# Patient Record
Sex: MALE | Race: WHITE | Employment: FULL TIME | URBAN - METROPOLITAN AREA
[De-identification: names, ages, dates, MRNs, and addresses within clinical notes are randomized per-mention and may not be internally consistent; named-entity substitution may affect disease eponyms.]

---

## 2020-01-15 ENCOUNTER — HOSPITAL ENCOUNTER (EMERGENCY)
Age: 50
Discharge: HOME | End: 2020-01-15
Attending: EMERGENCY MEDICINE
Payer: COMMERCIAL

## 2020-01-15 VITALS
BODY MASS INDEX: 21.47 KG/M2 | SYSTOLIC BLOOD PRESSURE: 158 MMHG | WEIGHT: 150 LBS | RESPIRATION RATE: 20 BRPM | HEIGHT: 70 IN | DIASTOLIC BLOOD PRESSURE: 108 MMHG | HEART RATE: 77 BPM | OXYGEN SATURATION: 99 % | TEMPERATURE: 98.6 F

## 2020-01-15 PROCEDURE — 99282 EMERGENCY DEPT VISIT SF MDM: CPT

## 2020-01-15 PROCEDURE — 12001 RPR S/N/AX/GEN/TRNK 2.5CM/<: CPT

## 2020-01-15 ASSESSMENT — PAIN SCALES - GENERAL: PAINLEVEL_OUTOF10: 2

## 2020-01-15 NOTE — LETTER
[facility]  [address]  [address]  Phone: [phone]  Fax: [phone]             January 15, 2020    Patient: Dora Salmon   YOB: 1970   Date of Visit: 1/15/2020       To Whom It May Concern:    Dora Salmon was seen and treated in our emergency department on 1/15/2020. He may return to work on 1/16/2020.       Sincerely,             Signature:__________________________________

## 2020-01-15 NOTE — ED PROVIDER NOTES
injury. No foreign bodies were identified. It was closed with x5 4-0 NYLON SUTURES. There were no complications during the procedure. ED COURSE/MDM  Patient seen and evaluated. I discussed results and plan of care with patient . I do feel patient can be safely discharged to home. Recommend follow up with PCP in 2-3 days for re-evaluation. Reasons to RT ED discussed. Patient expresses understanding and is in agreement with plan. CLINICAL IMPRESSION  1. Laceration of right middle finger without foreign body without damage to nail, initial encounter        Blood pressure (!) 158/108, pulse 77, temperature 98.6 °F (37 °C), resp. rate 20, height 5' 10\" (1.778 m), weight 150 lb (68 kg), SpO2 99 %. DISPOSITION  Patient was discharged to home in good condition. This chart was generated in part by using Dragon Dictation system and may contain errors related to that system including errors in grammar, punctuation, and spelling, as well as words and phrases that may be inappropriate. When dictating, effort is made to correct spelling/grammar errors. If there are any questions or concerns please feel free to contact the dictating provider for clarification.      Carmencita Kincaid DO  ATTENDING, [id number] [place] [address], DO  01/15/20 [id number]